# Patient Record
Sex: MALE | Race: WHITE | URBAN - METROPOLITAN AREA
[De-identification: names, ages, dates, MRNs, and addresses within clinical notes are randomized per-mention and may not be internally consistent; named-entity substitution may affect disease eponyms.]

---

## 2022-05-19 ENCOUNTER — OFFICE VISIT (OUTPATIENT)
Dept: URBAN - METROPOLITAN AREA CLINIC 23 | Facility: CLINIC | Age: 62
End: 2022-05-19
Payer: COMMERCIAL

## 2022-05-19 DIAGNOSIS — C44.1122 BASAL CELL CARCINOMA SKIN/ RIGHT LOWER EYELID, INC CANTHUS: Primary | ICD-10-CM

## 2022-05-19 PROCEDURE — 99204 OFFICE O/P NEW MOD 45 MIN: CPT | Performed by: OPHTHALMOLOGY

## 2022-05-19 ASSESSMENT — INTRAOCULAR PRESSURE
OS: 19
OD: 19

## 2022-05-19 NOTE — IMPRESSION/PLAN
Impression: Basal cell carcinoma skin/ right lower eyelid, inc canthus: G12.0241. Right. Condition: new problem addtl w/u needed. Symptoms: may improve with surgery. Vision: vision threatening. Plan: Discussed diagnosis in detail with patient. Discussed treatment options with patient. Biopsy of right lower eyelid is + for malignancy, will need to coordinate for Mohs procedure, followed by reconstruction of eyelid by myself. Surgical treatment is recommended. Surgical risks and benefits were discussed, explained and understood by patient. Patient elects to have surgery. RL2. Avoid NSAIDS 7 days prior to surgery.

## 2022-06-30 ENCOUNTER — POST-OPERATIVE VISIT (OUTPATIENT)
Dept: URBAN - METROPOLITAN AREA CLINIC 23 | Facility: CLINIC | Age: 62
End: 2022-06-30
Payer: COMMERCIAL

## 2022-06-30 DIAGNOSIS — Z48.89 ENCOUNTER FOR OTHER SPECIFIED SURGICAL AFTERCARE: Primary | ICD-10-CM

## 2022-06-30 PROCEDURE — 99024 POSTOP FOLLOW-UP VISIT: CPT | Performed by: OPHTHALMOLOGY

## 2022-06-30 NOTE — IMPRESSION/PLAN
Impression: S/P reconstruction w/sliding flap OD - 13 Days. Encounter for other specified surgical aftercare  Z48.89. Excellent post op course   Post operative instructions reviewed - Condition is improving - Plan: 2 sutures removed
photo updated today --Advised patient to use artificial tears for comfort. 
vaseline to skin flap PRN